# Patient Record
Sex: FEMALE | Race: WHITE | NOT HISPANIC OR LATINO | URBAN - METROPOLITAN AREA
[De-identification: names, ages, dates, MRNs, and addresses within clinical notes are randomized per-mention and may not be internally consistent; named-entity substitution may affect disease eponyms.]

---

## 2022-07-04 ENCOUNTER — EMERGENCY (EMERGENCY)
Facility: HOSPITAL | Age: 72
LOS: 1 days | Discharge: HOME | End: 2022-07-04
Admitting: EMERGENCY MEDICINE

## 2022-07-04 DIAGNOSIS — M25.522 PAIN IN LEFT ELBOW: ICD-10-CM

## 2022-07-04 DIAGNOSIS — S53.195A OTHER DISLOCATION OF LEFT ULNOHUMERAL JOINT, INITIAL ENCOUNTER: ICD-10-CM
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
## 2022-07-04 PROCEDURE — 73080 X-RAY EXAM OF ELBOW: CPT | Mod: 26,LT,77

## 2022-07-04 PROCEDURE — 73030 X-RAY EXAM OF SHOULDER: CPT | Mod: 26,LT

## 2022-07-04 PROCEDURE — 99284 EMERGENCY DEPT VISIT MOD MDM: CPT

## 2022-07-04 PROCEDURE — 73080 X-RAY EXAM OF ELBOW: CPT | Mod: 26,LT

## 2022-07-04 PROCEDURE — 73090 X-RAY EXAM OF FOREARM: CPT | Mod: 26,LT

## 2023-01-29 ENCOUNTER — NON-APPOINTMENT (OUTPATIENT)
Age: 73
End: 2023-01-29

## 2023-01-30 PROBLEM — Z00.00 ENCOUNTER FOR PREVENTIVE HEALTH EXAMINATION: Status: ACTIVE | Noted: 2023-01-30

## 2023-02-01 ENCOUNTER — NON-APPOINTMENT (OUTPATIENT)
Age: 73
End: 2023-02-01

## 2023-02-02 ENCOUNTER — APPOINTMENT (OUTPATIENT)
Dept: COLORECTAL SURGERY | Facility: CLINIC | Age: 73
End: 2023-02-02
Payer: MEDICARE

## 2023-02-02 VITALS
SYSTOLIC BLOOD PRESSURE: 122 MMHG | HEIGHT: 64 IN | TEMPERATURE: 98 F | WEIGHT: 114 LBS | DIASTOLIC BLOOD PRESSURE: 78 MMHG | BODY MASS INDEX: 19.46 KG/M2 | HEART RATE: 72 BPM

## 2023-02-02 DIAGNOSIS — L02.31 CUTANEOUS ABSCESS OF BUTTOCK: ICD-10-CM

## 2023-02-02 PROCEDURE — 99203 OFFICE O/P NEW LOW 30 MIN: CPT

## 2023-02-02 RX ORDER — BACILLUS COAGULANS/INULIN 1B-250 MG
CAPSULE ORAL
Refills: 0 | Status: ACTIVE | COMMUNITY

## 2023-02-02 RX ORDER — ROSUVASTATIN CALCIUM 10 MG/1
10 TABLET, FILM COATED ORAL
Refills: 0 | Status: ACTIVE | COMMUNITY

## 2023-02-02 RX ORDER — FEXOFENADINE HCL 60 MG
CAPSULE ORAL
Refills: 0 | Status: ACTIVE | COMMUNITY

## 2023-02-02 RX ORDER — VIT A/VIT C/VIT E/ZINC/COPPER 4296-226
CAPSULE ORAL
Refills: 0 | Status: ACTIVE | COMMUNITY

## 2023-02-02 NOTE — PHYSICAL EXAM
[FreeTextEntry1] : Medical assistant present for duration of physical examination\par \par General no acute distress, alert and oriented\par Psych calm, pleasant demeanor, responding appropriately to questions\par Nonlabored breathing\par Ambulating without assistance\par Skin normal color and pigment, no visible lesions or rashes\par \par Anorectal Exam:\par Inspection non erythema with superficial skin excoriation 1cm by 1cm on left buttock overlying ischial tuberosity, no fluctuance, mild induration\par Needle aspiration attempted with 18G needle, no return of fluid on aspiration. \par \par PAPITO nontender, no masses palpated, no blood on gloved finger, no fluctuance. \par \par Patient tolerated examination and procedure well.\par \par \par  n/a

## 2023-02-02 NOTE — HISTORY OF PRESENT ILLNESS
[FreeTextEntry1] : 71 yo F presents for evaluation of buttock abscess, referred by GI Dr. Amarjit Gipson\par Denies PMH, Medications: rosuvastatin, Allegra, Preservision Areds 2, vit D, probiotic\par former cigarette use x 2 years\par Denies PSH\par Father diagnosed w/ CRC age early 70s\par Last colonoscopy 5/15/19 w/ Dr. Gipson which was negative per pt\par \par Approx 1 month ago pt felt swelling and tenderness to touch. Denies h/o fever or chills.\par Patient unable to see the area and based on description,  thought it may be hemorrhoids. She treated it w/ OTC Prep H w/ reduction in swelling and area drained blood, followed by clear discharged.\par Seen by OLIVIA Gipson 3 weeks ago who advised warm compresses and topical neosporin. She stopped application two days ago\par \par Currently feels well, notes some residual firmness to area, decreased in size compared to when symptoms first started. Otherwise denies pain or drainage\par \par H/o bartholin's cyst many years ago (self diagnosed) which resolved w/ hot showers \par \par BH: daily, may go up to 5 times if feels stressed/anxious\par Reports daily intake of vegetables and raisin bran\par Takes probiotic, denies use of stool softeners or fiber supplements\par \par Denies ASA/NSAIDs in last 7 days\par \par

## 2023-02-02 NOTE — ASSESSMENT
[FreeTextEntry1] : Left buttock abscess, drained spontaneous. Location seen on exam is less likely for hemorrhoidal disease.\par No drainable fluid on current examination. \par Continue local wound care. Sitz baths/warm compresses. Topical Neosporin as needed.\par F/u if symptoms return or persist. If recurring symptoms, we discussed possibility of underlying anal fistula.\par All questions were answered, patient expressed understanding, and is agreeable to this plan.\par